# Patient Record
Sex: FEMALE | Race: BLACK OR AFRICAN AMERICAN | URBAN - METROPOLITAN AREA
[De-identification: names, ages, dates, MRNs, and addresses within clinical notes are randomized per-mention and may not be internally consistent; named-entity substitution may affect disease eponyms.]

---

## 2022-06-03 ENCOUNTER — OFFICE VISIT (OUTPATIENT)
Dept: URBAN - METROPOLITAN AREA CLINIC 17 | Facility: CLINIC | Age: 47
End: 2022-06-03
Payer: COMMERCIAL

## 2022-06-03 DIAGNOSIS — H25.22 AGE-RELATED HYPERMATURE CATARACT OF LEFT EYE: Primary | ICD-10-CM

## 2022-06-03 DIAGNOSIS — H18.623 KERATOCONUS, UNSTABLE, BILATERAL: ICD-10-CM

## 2022-06-03 PROCEDURE — 99204 OFFICE O/P NEW MOD 45 MIN: CPT | Performed by: OPHTHALMOLOGY

## 2022-06-03 ASSESSMENT — KERATOMETRY
OD: 44.75
OS: 62.75

## 2022-06-03 ASSESSMENT — INTRAOCULAR PRESSURE
OD: 12
OS: 12

## 2022-06-03 ASSESSMENT — VISUAL ACUITY
OD: 20/25
OS: CF

## 2022-06-03 NOTE — IMPRESSION/PLAN
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 Plan: Discussed diagnosis in detail with patient. Will continue to observe condition and or symptoms. May need corneal consult after cataract surgery.

## 2022-06-03 NOTE — IMPRESSION/PLAN
Impression: Age-related hypermature cataract of left eye: H25.22. Condition: established, worsening. Symptoms: could improve with surgery. Plan: Cataract accounts for patient's complaints. advised pt of more complex surgery due to corneal condition. Reviewed risks, benefits, and procedure. Patient desires surgery, schedule ce/iol OS, RL2, standard lens, distance refractive target, patient is clear for surgery in [facility]. Recommend Dexycu to avoid noncompliance with drops and to help maintain infection/inflammation. Will need trypan blue [id number]. Discussed visual outcome may be limited due to significant keratoconus OS. May need furture evaluation with cornea specialist to treat Keratoconus. Advised pt as he is unable to see out we are unable to see in. May have some limited visual improvement if he has any retina pathology in the eye.

## 2022-07-22 ENCOUNTER — PRE-OPERATIVE VISIT (OUTPATIENT)
Dept: URBAN - METROPOLITAN AREA CLINIC 17 | Facility: CLINIC | Age: 47
End: 2022-07-22
Payer: COMMERCIAL

## 2022-07-22 DIAGNOSIS — H25.22 AGE-RELATED CATARACT, MORGAGNIAN TYPE, LEFT EYE: Primary | ICD-10-CM

## 2022-07-22 ASSESSMENT — PACHYMETRY
OS: 27.28
OS: 4.45
OD: 24.91
OD: 3.55

## 2022-08-09 ENCOUNTER — SURGERY (OUTPATIENT)
Dept: URBAN - METROPOLITAN AREA SURGERY 7 | Facility: SURGERY | Age: 47
End: 2022-08-09
Payer: COMMERCIAL

## 2022-08-09 DIAGNOSIS — H25.22 AGE-RELATED CATARACT, MORGAGNIAN TYPE, LEFT EYE: Primary | ICD-10-CM

## 2022-08-09 PROCEDURE — 66982 XCAPSL CTRC RMVL CPLX WO ECP: CPT | Performed by: OPHTHALMOLOGY

## 2022-08-10 ENCOUNTER — POST-OPERATIVE VISIT (OUTPATIENT)
Dept: URBAN - METROPOLITAN AREA CLINIC 17 | Facility: CLINIC | Age: 47
End: 2022-08-10
Payer: COMMERCIAL

## 2022-08-10 DIAGNOSIS — Z96.1 PRESENCE OF INTRAOCULAR LENS: Primary | ICD-10-CM

## 2022-08-10 PROCEDURE — 99024 POSTOP FOLLOW-UP VISIT: CPT | Performed by: OPTOMETRIST

## 2022-08-10 ASSESSMENT — INTRAOCULAR PRESSURE
OS: 18
OD: 11

## 2022-08-10 NOTE — IMPRESSION/PLAN
Impression: S/P Cataract Extraction by phacoemulsification with IOL placement [id number] OS - 1 Day. Presence of intraocular lens  Z96.1. Plan: OS looks good for day 1. --Advised patient to use artificial tears for comfort.